# Patient Record
Sex: FEMALE | Race: WHITE | URBAN - METROPOLITAN AREA
[De-identification: names, ages, dates, MRNs, and addresses within clinical notes are randomized per-mention and may not be internally consistent; named-entity substitution may affect disease eponyms.]

---

## 2017-03-29 RX ORDER — PAROXETINE HYDROCHLORIDE 20 MG/1
TABLET, FILM COATED ORAL
COMMUNITY
Start: 2012-07-02 | End: 2017-08-14

## 2017-03-29 RX ORDER — PAROXETINE HYDROCHLORIDE 20 MG/1
TABLET, FILM COATED ORAL
Qty: 135 TABLET | Refills: 0 | Status: SHIPPED | OUTPATIENT
Start: 2017-03-29 | End: 2017-08-14

## 2017-08-14 NOTE — PATIENT INSTRUCTIONS
Med refilled  Due for mammogram, call [facility] to schedule  May schedule welcome to Medicare review for this fall  Due for colonoscopy, check with medicare for coverage

## 2017-08-14 NOTE — PROGRESS NOTES
[address]  PROGRESS NOTE  Chief Complaint:   Patient presents with: Follow - Up: medication renewal       HPI:   This is a 74year old female coming in for medication refill.   Overall patient feels like she is doing very well she is ac every morning. Disp: 90 tablet Rfl: 1      Ready to quit: No  Counseling given: Yes       REVIEW OF SYSTEMS:   CONSTITUTIONAL:  Denies unusual weight gain/loss, fever, chills, or fatigue.  SEE HPI  EENT:  Eyes:  Denies eye pain, visual loss, blurred vision, EOMI, PERRLA, no scleral icterus, conjunctivae clear bilaterally, no eye discharge Ears: External normal. Nose: patent, no nasal discharge Throat:  No tonsillar erythema or exudate. Mouth:  No oral lesions or ulcerations, good dentition.   NECK: Supple, , 07/21/2017    Patient/Caregiver Education: Patient/Caregiver Education: There are no barriers to learning. Medical education done. Outcome: Patient verbalizes understanding.  Patient is notified to call with any questions, complications, allergies, or wor

## 2018-02-07 RX ORDER — PAROXETINE HYDROCHLORIDE 20 MG/1
TABLET, FILM COATED ORAL
Qty: 90 TABLET | Refills: 1 | Status: SHIPPED | OUTPATIENT
Start: 2018-02-07 | End: 2018-08-06

## 2018-02-07 NOTE — TELEPHONE ENCOUNTER
Future appt:  None  Last Appointment:  8/14/2017;  May schedule welcome to Medicare review for this fall    No results found for: CHOLEST, HDL, LDL, TRIGLY, TRIG  No results found for: EAG, A1C  No results found for: T4F, TSH, TSHT4    Last Labs:  7/17/2012

## 2018-08-07 NOTE — PROGRESS NOTES
[facility]  PROGRESS NOTE  Chief Complaint:   Patient presents with:  Medication Follow-Up      HPI:   This is a 79year old female coming in for medication refill.   Patient has been on Paxil for 20 years for anxiety and feels that it co Cigarettes  Smokeless tobacco: Never Used                      Alcohol use: Yes           1.2 oz/week     Standard drinks or equivalent: 2 per week    Family History:  Family History   Problem Relation Age of Onset   • Cancer Father      Allergies:  No Jameel 23.85 kg/m²  Estimated body mass index is 23.85 kg/m² as calculated from the following:    Height as of this encounter: 66.5\". Weight as of this encounter: 150 lb. Vital signs reviewed. Appears stated age, well groomed.   Physical Exam:  GEN:  Patient Physical due on 07/21/1954  Annual Depression Screen due on 07/21/1964  FIT Colorectal Screening due on 07/21/2002  Fall Risk Screening due on 07/21/2017  DEXA Scan due on 07/21/2017  Pneumococcal PPSV23/PCV13 65+ Years / Low and Medium Risk(1 of 2 - PCV13

## 2018-08-20 ENCOUNTER — OFFICE VISIT (OUTPATIENT)
Dept: FAMILY MEDICINE CLINIC | Facility: CLINIC | Age: 68
End: 2018-08-20
Payer: MEDICARE

## 2018-08-20 ENCOUNTER — APPOINTMENT (OUTPATIENT)
Dept: LAB | Age: 68
End: 2018-08-20
Attending: FAMILY MEDICINE
Payer: MEDICARE

## 2018-08-20 VITALS
OXYGEN SATURATION: 91 % | HEIGHT: 66.5 IN | RESPIRATION RATE: 18 BRPM | DIASTOLIC BLOOD PRESSURE: 66 MMHG | SYSTOLIC BLOOD PRESSURE: 108 MMHG | BODY MASS INDEX: 23.48 KG/M2 | TEMPERATURE: 98 F | WEIGHT: 147.81 LBS | HEART RATE: 90 BPM

## 2018-08-20 DIAGNOSIS — F17.200 TOBACCO USE DISORDER: ICD-10-CM

## 2018-08-20 DIAGNOSIS — Z23 ENCOUNTER FOR IMMUNIZATION: ICD-10-CM

## 2018-08-20 DIAGNOSIS — Z23 NEED FOR PNEUMOCOCCAL VACCINATION: ICD-10-CM

## 2018-08-20 DIAGNOSIS — Z00.00 HEALTH CARE MAINTENANCE: Primary | ICD-10-CM

## 2018-08-20 LAB
ALBUMIN SERPL-MCNC: 3.8 G/DL (ref 3.5–4.8)
ALBUMIN/GLOB SERPL: 1.1 {RATIO} (ref 1–2)
ALP LIVER SERPL-CCNC: 70 U/L (ref 55–142)
ALT SERPL-CCNC: 20 U/L (ref 14–54)
ANION GAP SERPL CALC-SCNC: 6 MMOL/L (ref 0–18)
AST SERPL-CCNC: 21 U/L (ref 15–41)
BASOPHILS # BLD AUTO: 0.06 X10(3) UL (ref 0–0.1)
BASOPHILS NFR BLD AUTO: 0.7 %
BILIRUB SERPL-MCNC: 0.3 MG/DL (ref 0.1–2)
BUN BLD-MCNC: 11 MG/DL (ref 8–20)
BUN/CREAT SERPL: 15.7 (ref 10–20)
CALCIUM BLD-MCNC: 8.7 MG/DL (ref 8.3–10.3)
CHLORIDE SERPL-SCNC: 108 MMOL/L (ref 101–111)
CHOLEST SMN-MCNC: 192 MG/DL (ref ?–200)
CO2 SERPL-SCNC: 26 MMOL/L (ref 22–32)
CREAT BLD-MCNC: 0.7 MG/DL (ref 0.55–1.02)
EOSINOPHIL # BLD AUTO: 0.1 X10(3) UL (ref 0–0.3)
EOSINOPHIL NFR BLD AUTO: 1.1 %
ERYTHROCYTE [DISTWIDTH] IN BLOOD BY AUTOMATED COUNT: 13.8 % (ref 11.5–16)
GLOBULIN PLAS-MCNC: 3.5 G/DL (ref 2.5–4)
GLUCOSE BLD-MCNC: 82 MG/DL (ref 70–99)
HCT VFR BLD AUTO: 42 % (ref 34–50)
HDLC SERPL-MCNC: 85 MG/DL (ref 40–59)
HGB BLD-MCNC: 14 G/DL (ref 12–16)
IMMATURE GRANULOCYTE COUNT: 0.03 X10(3) UL (ref 0–1)
IMMATURE GRANULOCYTE RATIO %: 0.3 %
LDLC SERPL CALC-MCNC: 87 MG/DL (ref ?–100)
LYMPHOCYTES # BLD AUTO: 2.49 X10(3) UL (ref 0.9–4)
LYMPHOCYTES NFR BLD AUTO: 27.2 %
M PROTEIN MFR SERPL ELPH: 7.3 G/DL (ref 6.1–8.3)
MCH RBC QN AUTO: 32.8 PG (ref 27–33.2)
MCHC RBC AUTO-ENTMCNC: 33.3 G/DL (ref 31–37)
MCV RBC AUTO: 98.4 FL (ref 81–100)
MONOCYTES # BLD AUTO: 0.89 X10(3) UL (ref 0.1–1)
MONOCYTES NFR BLD AUTO: 9.7 %
NEUTROPHIL ABS PRELIM: 5.6 X10 (3) UL (ref 1.3–6.7)
NEUTROPHILS # BLD AUTO: 5.6 X10(3) UL (ref 1.3–6.7)
NEUTROPHILS NFR BLD AUTO: 61 %
NONHDLC SERPL-MCNC: 107 MG/DL (ref ?–130)
OSMOLALITY SERPL CALC.SUM OF ELEC: 288 MOSM/KG (ref 275–295)
PLATELET # BLD AUTO: 362 10(3)UL (ref 150–450)
POTASSIUM SERPL-SCNC: 4.2 MMOL/L (ref 3.6–5.1)
RBC # BLD AUTO: 4.27 X10(6)UL (ref 3.8–5.1)
RED CELL DISTRIBUTION WIDTH-SD: 50.2 FL (ref 35.1–46.3)
SODIUM SERPL-SCNC: 140 MMOL/L (ref 136–144)
TRIGL SERPL-MCNC: 100 MG/DL (ref 30–149)
VLDLC SERPL CALC-MCNC: 20 MG/DL (ref 0–30)
WBC # BLD AUTO: 9.2 X10(3) UL (ref 4–13)

## 2018-08-20 PROCEDURE — 85025 COMPLETE CBC W/AUTO DIFF WBC: CPT | Performed by: FAMILY MEDICINE

## 2018-08-20 PROCEDURE — G0009 ADMIN PNEUMOCOCCAL VACCINE: HCPCS | Performed by: FAMILY MEDICINE

## 2018-08-20 PROCEDURE — 36415 COLL VENOUS BLD VENIPUNCTURE: CPT | Performed by: FAMILY MEDICINE

## 2018-08-20 PROCEDURE — 80061 LIPID PANEL: CPT | Performed by: FAMILY MEDICINE

## 2018-08-20 PROCEDURE — G0438 PPPS, INITIAL VISIT: HCPCS | Performed by: FAMILY MEDICINE

## 2018-08-20 PROCEDURE — 90670 PCV13 VACCINE IM: CPT | Performed by: FAMILY MEDICINE

## 2018-08-20 PROCEDURE — 80053 COMPREHEN METABOLIC PANEL: CPT | Performed by: FAMILY MEDICINE

## 2018-08-20 NOTE — PATIENT INSTRUCTIONS
Await labs  Due for screening mammogram and colonoscopy  prevnar 13 vaccine today  Flu vaccine in Oct

## 2018-08-20 NOTE — PROGRESS NOTES
[address]  PROGRESS NOTE  Chief Complaint:   Patient presents with: Well Adult      HPI:   This is a 79year old female coming in for wellness visit. Patient's chronic medical problems include chronic anxiety and nicotine addiction. use: Yes           1.2 oz/week     Standard drinks or equivalent: 2 per week    Family History:  Family History   Problem Relation Age of Onset   • Cancer Father    • Pulmonary Disease Mother    • Dementia Mother    • Cancer Paternal Grandmother 48     stella °F (36.7 °C) (Temporal)   Resp 18   Ht 66.5\"   Wt 147 lb 12.8 oz   SpO2 91%   BMI 23.50 kg/m²  Estimated body mass index is 23.5 kg/m² as calculated from the following:    Height as of this encounter: 66.5\".     Weight as of this encounter: 147 lb 12.8 oz DIFFERENTIAL    Tobacco use disorder    Encounter for immunization   -     PNEUMOCOCCAL VACC, 13 TESFAYE IM    Need for pneumococcal vaccination    A/P: Patient here for wellness exam for the first time in 18 years.   We will draw lab studies today as patient i

## 2018-08-21 ENCOUNTER — TELEPHONE (OUTPATIENT)
Dept: FAMILY MEDICINE CLINIC | Facility: CLINIC | Age: 68
End: 2018-08-21

## 2019-02-09 RX ORDER — PAROXETINE HYDROCHLORIDE 20 MG/1
TABLET, FILM COATED ORAL
Qty: 90 TABLET | Refills: 1 | Status: SHIPPED | OUTPATIENT
Start: 2019-02-09 | End: 2019-08-05

## 2019-02-09 NOTE — TELEPHONE ENCOUNTER
Patient needs a refill on her Paroxetine 20mg to [facility].  Patient only has a few left and would like to  her medication on Monday 2/11/19

## 2019-02-09 NOTE — TELEPHONE ENCOUNTER
Please advise refill of Paroxetine.   Last Rx: 8/6/18    Future appt:    Last Appointment with Provider:  8/20/2018 per office visit notes pt is to f/u in 1 year    Last Appointment at [facility]:  8/20/2018    Lab Results   Component Value Date

## 2019-08-05 ENCOUNTER — TELEPHONE (OUTPATIENT)
Dept: FAMILY MEDICINE CLINIC | Facility: CLINIC | Age: 69
End: 2019-08-05

## 2019-08-05 RX ORDER — PAROXETINE HYDROCHLORIDE 20 MG/1
TABLET, FILM COATED ORAL
Qty: 90 TABLET | Refills: 0 | Status: SHIPPED | OUTPATIENT
Start: 2019-08-05 | End: 2019-08-12

## 2019-08-05 NOTE — TELEPHONE ENCOUNTER
Future appt:    Last Appointment with provider:  8/20/18 physical  Last appointment at [facility]:  Visit date not found  Cholesterol, Total (mg/dL)   Date Value   08/20/2018 192     HDL Cholesterol (mg/dL)   Date Value   08/20/2018 85 (H)     LDL Choles

## 2019-08-12 RX ORDER — PAROXETINE HYDROCHLORIDE 20 MG/1
TABLET, FILM COATED ORAL
Qty: 90 TABLET | Refills: 0 | Status: SHIPPED | OUTPATIENT
Start: 2019-08-12 | End: 2019-08-14

## 2019-08-12 NOTE — TELEPHONE ENCOUNTER
Future appt:    Last Appointment with provider:   Visit date not found  Last appointment at [facility]:  Visit date not found    Pt states she picked up her med refill last week, states she was running errands and states she has looked everywhere but she

## 2019-08-12 NOTE — TELEPHONE ENCOUNTER
Patient picked up her refill on Paroxetine last week, but somehow lost it. Only has 1 pill left. Needs a new Rx called to [facility] in [place].

## 2019-08-14 RX ORDER — PAROXETINE HYDROCHLORIDE 20 MG/1
TABLET, FILM COATED ORAL
Qty: 90 TABLET | Refills: 0 | Status: SHIPPED | OUTPATIENT
Start: 2019-08-14 | End: 2019-09-20

## 2019-08-14 NOTE — TELEPHONE ENCOUNTER
Patient needs refill on his Paroxetine 20mg to [address], patient states that she had a refill but she somehow lost it. Patient is completely out.

## 2019-08-14 NOTE — TELEPHONE ENCOUNTER
Pt states that she picked up her refill of Paroxetine on Monday and lost the whole bottle. She was shopping and is not sure if someone stole it out of her cart or if it fell out of the cart etc.    Pt is completely out of meds. Please advise refill.     She

## 2019-09-20 ENCOUNTER — HOSPITAL ENCOUNTER (OUTPATIENT)
Dept: MAMMOGRAPHY | Age: 69
Discharge: HOME | End: 2019-09-20
Attending: FAMILY MEDICINE
Payer: MEDICARE

## 2019-09-20 ENCOUNTER — OFFICE VISIT (OUTPATIENT)
Dept: FAMILY MEDICINE CLINIC | Facility: CLINIC | Age: 69
End: 2019-09-20
Payer: MEDICARE

## 2019-09-20 VITALS
HEIGHT: 66.5 IN | RESPIRATION RATE: 16 BRPM | TEMPERATURE: 98 F | BODY MASS INDEX: 24.01 KG/M2 | DIASTOLIC BLOOD PRESSURE: 62 MMHG | HEART RATE: 82 BPM | SYSTOLIC BLOOD PRESSURE: 106 MMHG | WEIGHT: 151.19 LBS | OXYGEN SATURATION: 98 %

## 2019-09-20 DIAGNOSIS — F17.200 TOBACCO USE DISORDER: ICD-10-CM

## 2019-09-20 DIAGNOSIS — F41.1 ANXIETY STATE: ICD-10-CM

## 2019-09-20 DIAGNOSIS — Z00.00 HEALTH CARE MAINTENANCE: Primary | ICD-10-CM

## 2019-09-20 DIAGNOSIS — Z12.31 BREAST CANCER SCREENING BY MAMMOGRAM: ICD-10-CM

## 2019-09-20 PROCEDURE — G0439 PPPS, SUBSEQ VISIT: HCPCS | Performed by: FAMILY MEDICINE

## 2019-09-20 RX ORDER — PAROXETINE HYDROCHLORIDE 20 MG/1
TABLET, FILM COATED ORAL
Qty: 90 TABLET | Refills: 3 | Status: SHIPPED | OUTPATIENT
Start: 2019-09-20 | End: 2020-10-28

## 2019-09-20 NOTE — PATIENT INSTRUCTIONS
Schedule fasting labs and mammogram, 12 hours water only  meds refilled  Regular walking  QUIT SMOKING

## 2019-09-23 NOTE — PROGRESS NOTES
[address]  PROGRESS NOTE  Chief Complaint:   Patient presents with: Well Adult      HPI:   This is a 81year old female coming in for yearly wellness visit.   Patient's only chronic medical problem is been her anxiety which has been wel 295 mOsm/kg    GFR, Non- 91 >=60    GFR, -American 104 >=60    AST 21 15 - 41 U/L    ALT 20 14 - 54 U/L    Alkaline Phosphatase 70 55 - 142 U/L    Bilirubin, Total 0.3 0.1 - 2.0 mg/dL    Total Protein 7.3 6.1 - 8.3 g/dL    Albumin 3. Standard drinks or equivalent per week    Drug use: No    Family History:  Family History   Problem Relation Age of Onset   • Cancer Father    • Pulmonary Disease Mother    • Dementia Mother    • Cancer Paternal Grandmother 48        leukemia   • Heart Dis 18   Ht 66.5\"   Wt 151 lb 3.2 oz   SpO2 98%   BMI 24.04 kg/m²  Estimated body mass index is 24.04 kg/m² as calculated from the following:    Height as of this encounter: 66.5\". Weight as of this encounter: 151 lb 3.2 oz. Vital signs reviewed. Appears MOUTH ONCE DAILY IN THE MORNING    A/P: Patient here for yearly wellness visit with essentially a normal exam.  Patient's chronic lung problems include anxiety and nicotine disorder.   Patient is anxiety currently doing well with her Paxil 20 mg will contin

## 2020-10-28 NOTE — PATIENT INSTRUCTIONS
Pneumonia shot today (pneumovax 23)     Consider Tetanus shot - especially if injured     Quit smoking     Recommend Designating someone as your power of  for health care and discuss your wishes for your health if you are unable to make decisions f Visit    Abdominal aortic aneurysm screening (once between ages 73-68) IPPE only No results found for this or any previous visit.  Limited to patients who meet one of the following criteria:   • Men who are 73-68 years old and have smoked more than 100 ciga 74, and as needed after 75 Mammogram due on 07/21/1992 Please get this Mammogram regularly   Immunizations      Influenza  Covered Annually No orders found for this or any previous visit.  Please get every year    Pneumococcal 13 (Prevnar)  Covered Once aft [place])  www. [email or web address]. org  This link also has information from the [address] [place] [address] regarding Advance Directives.

## 2020-10-28 NOTE — PROGRESS NOTES
HPI:   Valery Dao is a 78year old female who presents for a Medicare Subsequent Annual Wellness visit (Pt already had Initial Annual Wellness).     Patient presents today for her annual wellness exam.  Patient has a history of anxiety–states she [place] Directive:  She does NOT have a Living Will on file in [place].    Advance care planning including the explanation and discussion of advance directives standard forms performed Face to Face with patient and Family/surrogate (if present), and forms available to •  PARoxetine HCl 20 MG Oral Tab, TAKE ONE TABLET BY MOUTH ONCE DAILY IN THE MORNING       MEDICAL INFORMATION:   She  has a past medical history of Anxiety, Bladder polyps, and Orthostatic hypotension.     She  has a past surgical history that includes area–large seborrheic keratosis-like moles  EYES: Conjunctive a clear no erythema PERRL  HENT: Normocephalic, atraumatic, tympanic membranes are clear, nose is clear, throat–no erythema or exudate  LUNGS: Slightly diminished throughout otherwise clear  CAR to health  -     CT LUNG LD SCREENING(CPT=); Future    Encounter for annual health examination  -     COMP METABOLIC PANEL (14); Future  -     TSH W REFLEX TO FREE T4; Future  -     CBC WITH DIFFERENTIAL WITH PLATELET;  Future    seborrhic kerotosis- s if medically necessary Electrocardiogram date       Colorectal Cancer Screening      Colonoscopy Screen every 10 years Colonoscopy due on 07/21/2002 Update Health Maintenance if applicable    Flex Sigmoidoscopy Screen every 10 years No results found for th prescription benefits, but Medicare does not cover unless Medically needed    Zoster  Not covered by Medicare Part B No vaccine history found This may be covered with your pharmacy  prescription benefits                      Patient Instructions     Pneumo Triglycerides (mg/dL)   Date Value   08/20/2018 100        EKG - covered if needed at Welcome to Medicare, and non-screening if indicated for medical reasons Electrocardiogram date Routine EKG is not a screening covered service except at the [facility] 70 or Yearly if High Risk   There are no preventive care reminders to display for this patient. Chlamydia  Annually if high risk No results found for: CHLAMYDIA No flowsheet data found.     Screening Mammogram      Mammogram    Recommend Annually to at information including definitions of the different types of Advance Directives. It also has the State forms available on it's website for anyone to review and print using their home computer and printer. (the forms are also available in [address])  www. [email or web address]

## 2021-01-28 ENCOUNTER — PATIENT OUTREACH (OUTPATIENT)
Dept: FAMILY MEDICINE CLINIC | Facility: CLINIC | Age: 71
End: 2021-01-28

## 2021-03-12 DIAGNOSIS — Z23 NEED FOR VACCINATION: ICD-10-CM

## 2021-06-03 ENCOUNTER — PATIENT OUTREACH (OUTPATIENT)
Dept: FAMILY MEDICINE CLINIC | Facility: CLINIC | Age: 71
End: 2021-06-03

## 2021-11-29 ENCOUNTER — OFFICE VISIT (OUTPATIENT)
Dept: FAMILY MEDICINE CLINIC | Facility: CLINIC | Age: 71
End: 2021-11-29
Payer: MEDICARE

## 2021-11-29 ENCOUNTER — LAB ENCOUNTER (OUTPATIENT)
Dept: LAB | Age: 71
End: 2021-11-29
Attending: NURSE PRACTITIONER
Payer: MEDICARE

## 2021-11-29 VITALS
HEART RATE: 97 BPM | TEMPERATURE: 98 F | WEIGHT: 160 LBS | OXYGEN SATURATION: 97 % | BODY MASS INDEX: 25.71 KG/M2 | RESPIRATION RATE: 18 BRPM | DIASTOLIC BLOOD PRESSURE: 86 MMHG | HEIGHT: 66 IN | SYSTOLIC BLOOD PRESSURE: 108 MMHG

## 2021-11-29 DIAGNOSIS — Z78.0 POSTMENOPAUSAL: ICD-10-CM

## 2021-11-29 DIAGNOSIS — Z12.31 VISIT FOR SCREENING MAMMOGRAM: ICD-10-CM

## 2021-11-29 DIAGNOSIS — F41.1 ANXIETY STATE: ICD-10-CM

## 2021-11-29 DIAGNOSIS — L82.1 OTHER SEBORRHEIC KERATOSIS: ICD-10-CM

## 2021-11-29 DIAGNOSIS — Z00.00 ENCOUNTER FOR ANNUAL HEALTH EXAMINATION: Primary | ICD-10-CM

## 2021-11-29 DIAGNOSIS — F17.200 TOBACCO USE DISORDER: ICD-10-CM

## 2021-11-29 DIAGNOSIS — Z23 FLU VACCINE NEED: ICD-10-CM

## 2021-11-29 DIAGNOSIS — Z12.11 ENCOUNTER FOR SCREENING COLONOSCOPY: ICD-10-CM

## 2021-11-29 PROCEDURE — 90662 IIV NO PRSV INCREASED AG IM: CPT | Performed by: NURSE PRACTITIONER

## 2021-11-29 PROCEDURE — 93000 ELECTROCARDIOGRAM COMPLETE: CPT | Performed by: NURSE PRACTITIONER

## 2021-11-29 PROCEDURE — G0439 PPPS, SUBSEQ VISIT: HCPCS | Performed by: NURSE PRACTITIONER

## 2021-11-29 PROCEDURE — G0008 ADMIN INFLUENZA VIRUS VAC: HCPCS | Performed by: NURSE PRACTITIONER

## 2021-11-29 PROCEDURE — 85025 COMPLETE CBC W/AUTO DIFF WBC: CPT | Performed by: NURSE PRACTITIONER

## 2021-11-29 PROCEDURE — 80053 COMPREHEN METABOLIC PANEL: CPT | Performed by: NURSE PRACTITIONER

## 2021-11-29 PROCEDURE — 84443 ASSAY THYROID STIM HORMONE: CPT | Performed by: NURSE PRACTITIONER

## 2021-11-29 PROCEDURE — 36415 COLL VENOUS BLD VENIPUNCTURE: CPT | Performed by: NURSE PRACTITIONER

## 2021-11-29 RX ORDER — PAROXETINE HYDROCHLORIDE 20 MG/1
TABLET, FILM COATED ORAL
Qty: 90 TABLET | Refills: 3 | Status: SHIPPED | OUTPATIENT
Start: 2021-11-29

## 2021-11-29 NOTE — PATIENT INSTRUCTIONS
High dose Flu shot today     Follow up for Covid booster - as planned -     [phone]    It is important to get enough sleep (at least 7 hrs a night).   Increase EXERCISE, eat a healthy diet (5 fruits and/or vege following risk factors   • Men who are 73-68 years old and have ever smoked   • Anyone with a family history -     Colorectal Cancer Screening  Covered for ages 52-80; only need ONE of the following:    Colonoscopy   Covered every 10 years    Covered every Recommended Websites for Advanced Directives    SeekAlumni.no. org/publications/Documents/personal_dec. pdf  An information packet, including necessary form from the [employer] website. http://www. [place].state. [place]/public/books/adv

## 2021-11-29 NOTE — PROGRESS NOTES
HPI:   Swapnil Miller is a 73year old female who presents for a Medicare Subsequent Annual Wellness visit (Pt already had Initial Annual Wellness).       Annual Physical due on 11/29/2022   Patient has been taking paroxetine–states she is doing well and I gave tobacco cessation counseling today. CAGE Alcohol Screen Please Preform Now    Cage NOT Performed in the last 6 months, please do assessment! Last Cage score was 0 on 10/28/2020.         Patient Care Team: Patient Care Team:  Melanie Schrader, about 2.0 standard drinks of alcohol per week. She reports current drug use. Drug: Cannabis.      REVIEW OF SYSTEMS:   Review of Systems      Negative except see HPI  EXAM:   /86 (BP Location: Left arm, Patient Position: Sitting, Cuff Size: adult)   P CBC WITH DIFFERENTIAL WITH PLATELET;  Future  -     ELECTROCARDIOGRAM, COMPLETE  -     Cancel: PFT MISCELLANEOUS    Anxiety state  -     PARoxetine 20 MG Oral Tab; TAKE ONE TABLET BY MOUTH ONCE DAILY IN THE MORNING    Visit for screening mammogram  -     MA 11/29/2021        Cardiovascular Disease Screening    Lipid Panel  Cholesterol  Lipoprotein (HDL)  Triglycerides Covered every 5 years for all Medicare beneficiaries without apparent signs or symptoms of cardiovascular disease Lab Results   Component Value Prevnar 13: 08/20/2018    [id number]: 10/28/2020     No recommendations at this time    Hepatitis B One screening covered for patients with certain risk factors   -  No recommendations at this time    Tetanus Toxoid Not covered by Medicare Part B unless m Results   Component Value Date    GLU 82 08/20/2018        Cardiovascular Disease Screening    Lipid Panel  Cholesterol  Lipoprotein (HDL)  Triglycerides Covered every 5 years for all Medicare beneficiaries without apparent signs or symptoms of cardiovascu [id number]) covered once after 65 Prevnar 13: 08/20/2018    [id number]: 10/28/2020     No recommendations at this time    Hepatitis B One screening covered for patients with certain risk factors   -  No recommendations at this time    Tetanus Toxoid Not

## 2021-11-30 ENCOUNTER — TELEPHONE (OUTPATIENT)
Dept: FAMILY MEDICINE CLINIC | Facility: CLINIC | Age: 71
End: 2021-11-30

## 2021-11-30 NOTE — TELEPHONE ENCOUNTER
----- Message from CHICHI Viera sent at 11/30/2021  8:07 AM CST -----  Please notify patient that her blood work is within normal limits CBC, CMP, thyroid

## 2022-04-01 ENCOUNTER — OFFICE VISIT (OUTPATIENT)
Dept: FAMILY MEDICINE CLINIC | Facility: CLINIC | Age: 72
End: 2022-04-01
Payer: MEDICARE

## 2022-04-01 VITALS
HEIGHT: 66 IN | DIASTOLIC BLOOD PRESSURE: 88 MMHG | HEART RATE: 87 BPM | TEMPERATURE: 97 F | SYSTOLIC BLOOD PRESSURE: 126 MMHG | OXYGEN SATURATION: 94 % | WEIGHT: 161 LBS | RESPIRATION RATE: 18 BRPM | BODY MASS INDEX: 25.88 KG/M2

## 2022-04-01 DIAGNOSIS — F41.1 ANXIETY STATE: ICD-10-CM

## 2022-04-01 DIAGNOSIS — Z01.818 PREOPERATIVE EXAMINATION: Primary | ICD-10-CM

## 2022-04-01 DIAGNOSIS — H02.009: ICD-10-CM

## 2022-04-01 DIAGNOSIS — Z12.11 COLON CANCER SCREENING: ICD-10-CM

## 2022-04-01 PROCEDURE — 99214 OFFICE O/P EST MOD 30 MIN: CPT | Performed by: FAMILY MEDICINE

## 2022-04-01 PROCEDURE — 3079F DIAST BP 80-89 MM HG: CPT | Performed by: FAMILY MEDICINE

## 2022-04-01 PROCEDURE — 3074F SYST BP LT 130 MM HG: CPT | Performed by: FAMILY MEDICINE

## 2022-04-01 PROCEDURE — 93000 ELECTROCARDIOGRAM COMPLETE: CPT | Performed by: FAMILY MEDICINE

## 2022-04-01 PROCEDURE — 3008F BODY MASS INDEX DOCD: CPT | Performed by: FAMILY MEDICINE

## 2022-04-01 NOTE — PATIENT INSTRUCTIONS
Continue current medications  Recommend to quit smoking. After today's assessment  patient is at optimum health for surgery and relatively at low risk. There are no contraindication for procedure. Recommend to avoid any use of aleve, aspirin or ibuprofen 1 week before surgery.         Recommend to check FIT test.

## 2022-11-30 ENCOUNTER — OFFICE VISIT (OUTPATIENT)
Dept: FAMILY MEDICINE CLINIC | Facility: CLINIC | Age: 72
End: 2022-11-30
Payer: MEDICARE

## 2022-11-30 VITALS
SYSTOLIC BLOOD PRESSURE: 130 MMHG | OXYGEN SATURATION: 97 % | DIASTOLIC BLOOD PRESSURE: 84 MMHG | TEMPERATURE: 97 F | BODY MASS INDEX: 24.78 KG/M2 | RESPIRATION RATE: 16 BRPM | HEIGHT: 66 IN | HEART RATE: 80 BPM | WEIGHT: 154.19 LBS

## 2022-11-30 DIAGNOSIS — F41.1 ANXIETY STATE: ICD-10-CM

## 2022-11-30 DIAGNOSIS — R41.3 MEMORY IMPAIRMENT: ICD-10-CM

## 2022-11-30 DIAGNOSIS — Z00.00 ENCOUNTER FOR ANNUAL HEALTH EXAMINATION: ICD-10-CM

## 2022-11-30 DIAGNOSIS — F17.200 TOBACCO USE DISORDER: ICD-10-CM

## 2022-11-30 DIAGNOSIS — Z13.6 SCREENING FOR CARDIOVASCULAR CONDITION: ICD-10-CM

## 2022-11-30 DIAGNOSIS — Z12.31 BREAST CANCER SCREENING BY MAMMOGRAM: ICD-10-CM

## 2022-11-30 DIAGNOSIS — Z00.00 ENCOUNTER FOR MEDICARE ANNUAL WELLNESS EXAM: Primary | ICD-10-CM

## 2022-11-30 RX ORDER — PAROXETINE HYDROCHLORIDE 20 MG/1
TABLET, FILM COATED ORAL
Qty: 90 TABLET | Refills: 3 | Status: SHIPPED | OUTPATIENT
Start: 2022-11-30

## 2022-11-30 NOTE — PATIENT INSTRUCTIONS
Continue current medications   Anxiety stable with medication. Recommend mammogram.  Recommend shingles vaccine. Recommend to quit smoking. Daily multivitamin. Schedule appointment with neurologist.     Schedule labs at:    [facility]  Address: [address] [facility], [address]  Phone: [phone] [address]   Tests on this list are recommended by your physician but may not be covered, or covered at this frequency, by your insurer. Please check with your insurance carrier before scheduling to verify coverage.    PREVENTATIVE SERVICES FREQUENCY &  COVERAGE DETAILS LAST COMPLETION DATE   Diabetes Screening    Fasting Blood Sugar /  Glucose    One screening every 12 months if never tested or if previously tested but not diagnosed with pre-diabetes   One screening every 6 months if diagnosed with pre-diabetes Lab Results   Component Value Date    GLU 82 11/29/2021        Cardiovascular Disease Screening    Lipid Panel  Cholesterol  Lipoprotein (HDL)  Triglycerides Covered every 5 years for all Medicare beneficiaries without apparent signs or symptoms of cardiovascular disease Lab Results   Component Value Date    CHOLEST 192 08/20/2018    HDL 85 (H) 08/20/2018    LDL 87 08/20/2018    TRIG 100 08/20/2018         Electrocardiogram (EKG)   Covered if needed at Welcome to Medicare, and non-screening if indicated for medical reasons 04/23/2022      Ultrasound Screening for Abdominal Aortic Aneurysm (AAA) Covered once in a lifetime for one of the following risk factors    Men who are 73-68 years old and have ever smoked    Anyone with a family history -     Colorectal Cancer Screening  Covered for ages 52-80; only need ONE of the following:    Colonoscopy   Covered every 10 years    Covered every 2 years if patient is at high risk or previous colonoscopy was abnormal -    Colorectal Cancer Screening Never done    Flexible Sigmoidoscopy   Covered every 4 years - Fecal Occult Blood Test Covered annually -   Bone Density Screening    Bone density screening    Covered every 2 years after age 72 if diagnosed with risk of osteoporosis or estrogen deficiency. Covered yearly for long-term glucocorticoid medication use (Steroids) No results found for this or any previous visit. DEXA Scan Never done   Pap and Pelvic    Pap   Covered every 2 years for women at normal risk;  Annually if at high risk -  No recommendations at this time    Chlamydia Annually if high risk -  No recommendations at this time   Screening Mammogram    Mammogram     Recommend annually for all female patients aged 36 and older    One baseline mammogram covered for patients aged 32-38 -    Mammogram Never done    Immunizations    Influenza Covered once per flu season  Please get every year -  Influenza Vaccine(1) due on 10/01/2022    Pneumococcal Each vaccine (Rfrbliz14 & [id number]) covered once after 72 Prevnar 13: 08/20/2018    [id number]: 10/28/2020     No recommendations at this time    Hepatitis B One screening covered for patients with certain risk factors   -  No recommendations at this time    Tetanus Toxoid Not covered by Medicare Part B unless medically necessary (cut with metal); may be covered with your pharmacy prescription benefits -    Tetanus, Diptheria and Pertusis TD and TDaP Not covered by Medicare Part B -  No recommendations at this time    Zoster Not covered by Medicare Part B; may be covered with your pharmacy  prescription benefits -  Zoster Vaccines(1 of 2) Never done

## (undated) NOTE — LETTER
6/3/2021  [address] [facility]    We take each of our patient's health very seriously and the key to maintaining your health is an annual wellness physical.  Review of your medical records shows that it is time for your annual wel

## (undated) NOTE — LETTER
01/27/22        Kathrine Gallardo  [address] [facility]        Dear Oliva [place],      [address] records indicate that you have outstanding lab work and or testing that was ordered for you and has not yet been completed:                Mammogram Screening

## (undated) NOTE — LETTER
05/28/20        Daksha Hamm  [address] [facility]      Dear Krys Odell records indicate that you have outstanding lab work and or testing that was ordered for you and has not yet been completed:  Orders Placed This Encounter      ELIZABETH W

## (undated) NOTE — LETTER
10/22/19        Catarino Mims  [address] [facility]      Dear Jennifer Hameed records indicate that you have outstanding lab work and or testing that was ordered for you and has not yet been completed:  Orders Placed This Encounter      ELIZABETH W